# Patient Record
Sex: FEMALE | Race: BLACK OR AFRICAN AMERICAN | NOT HISPANIC OR LATINO | URBAN - NONMETROPOLITAN AREA
[De-identification: names, ages, dates, MRNs, and addresses within clinical notes are randomized per-mention and may not be internally consistent; named-entity substitution may affect disease eponyms.]

---

## 2017-04-20 NOTE — PATIENT DISCUSSION
The patient is at high risk for a choroidal neovascular membrane. We will watch closely for progression.

## 2018-06-07 PROBLEM — E11.3293: Noted: 2018-12-10

## 2018-06-07 PROBLEM — Z96.1: Noted: 2018-06-07

## 2019-08-06 ENCOUNTER — IMPORTED ENCOUNTER (OUTPATIENT)
Dept: URBAN - NONMETROPOLITAN AREA CLINIC 1 | Facility: CLINIC | Age: 69
End: 2019-08-06

## 2019-08-06 PROCEDURE — 92134 CPTRZ OPH DX IMG PST SGM RTA: CPT

## 2019-08-06 PROCEDURE — 92014 COMPRE OPH EXAM EST PT 1/>: CPT

## 2019-08-06 NOTE — PATIENT DISCUSSION
DM c NPDR OD  s/p multiple focal OU and PRP laser treatments OS -Stressed the importance of keeping blood sugars under control blood pressure under control and weight normalization and regular visits with PCP. -Explained the possible effects of poorly controlled diabetes and the damage that diabetes can cause to ocular health. -Patient to check HgbA1C.-Pt instructed to contact our office with any vision changes. - MAC reviewed today Attached vit OU otherwise normal OU s/p PCIOL-Stable PCIOL s/p YAG Caps OU.-Monitor. OCT MAC interpreted today 8-6-2019:irregular surface no edema""

## 2019-10-30 NOTE — PATIENT DISCUSSION
The patient is at high risk for a choroidal neovascular membrane. We will watch closely for progression. Take over the counter pain medication

## 2022-04-09 ASSESSMENT — VISUAL ACUITY
OS_SC: 20/25-
OD_SC: 20/30+

## 2022-04-09 ASSESSMENT — TONOMETRY
OS_IOP_MMHG: 17
OD_IOP_MMHG: 18
